# Patient Record
Sex: FEMALE | Race: AMERICAN INDIAN OR ALASKA NATIVE
[De-identification: names, ages, dates, MRNs, and addresses within clinical notes are randomized per-mention and may not be internally consistent; named-entity substitution may affect disease eponyms.]

---

## 2020-04-12 ENCOUNTER — HOSPITAL ENCOUNTER (EMERGENCY)
Dept: HOSPITAL 5 - ED | Age: 45
Discharge: LEFT BEFORE DISCHARGE | End: 2020-04-12
Payer: OTHER GOVERNMENT

## 2020-04-12 VITALS — DIASTOLIC BLOOD PRESSURE: 101 MMHG | SYSTOLIC BLOOD PRESSURE: 140 MMHG

## 2020-04-12 DIAGNOSIS — Z90.710: ICD-10-CM
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
## 2020-04-12 DIAGNOSIS — M54.12: ICD-10-CM
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
## 2020-04-12 DIAGNOSIS — S16.1XXA: Primary | ICD-10-CM
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
## 2020-04-12 PROCEDURE — 99282 EMERGENCY DEPT VISIT SF MDM: CPT

## 2020-04-12 PROCEDURE — 96372 THER/PROPH/DIAG INJ SC/IM: CPT

## 2020-04-12 NOTE — EMERGENCY DEPARTMENT REPORT
ED Neck Pain/Injury HPI





- General


Chief Complaint: Neck Pain/Injury


Stated Complaint: NECK PAIN, LEFT SIDE


Time Seen by Provider: 04/12/20 17:36


Mode of arrival: Ambulatory


Limitations: No Limitations





- History of Present Illness


Initial Comments: 





This is a 44-year-old female nontoxic, well nourished in appearance, no acute 

signs of distress presents to the ED with c/o of acute on chronic upper back 

pain x1 year.  Patient is seeing a neurologist and has an MRI that is ordered of

cervical spine.  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 

similar symptoms as today.  Patient states that pain radiates through to his 

left upper extremity.  Patient denies any trauma.  Denies any bladder or bowel 

instability.  Patient denies any urinary symptoms.  Denies decreased ROM or 

weakness. Denies any fever, chills, nausea, vomiting, headache, stiff neck, 

chest pain or shortness of breath.  Patient denies any numbness or tingling.  

Denies any allergies.  


MD Complaint: upper back pain


-: year(s)


Radiation: left upper extremity


Severity: mild


Severity scale (0 -10): 3


Quality: aching


Consistency: intermittent


Improves With: immobilization


Worsens With: movement of extremity, movement of neck


Associated Symptoms: none.  denies: headache, fever, numbness, tingling, 

weakness, vertigo, difficulty walking, swollen glands, difficulty swallowing, 

nausea, vomiting


Treatments Prior to Arrival: none





- Related Data


                                    Allergies











Allergy/AdvReac Type Severity Reaction Status Date / Time


 


No Known Allergies Allergy   Unverified 04/12/20 17:18














ED Review of Systems


ROS: 


Stated complaint: NECK PAIN, LEFT SIDE


Other details as noted in HPI





Constitutional: denies: chills, fever


Eyes: denies: eye pain, eye discharge, vision change


ENT: denies: ear pain, throat pain


Respiratory: denies: cough, shortness of breath, wheezing


Cardiovascular: denies: chest pain, palpitations


Endocrine: no symptoms reported


Gastrointestinal: denies: abdominal pain, nausea, diarrhea


Genitourinary: denies: urgency, dysuria, discharge


Musculoskeletal: denies: back pain, joint swelling, arthralgia


Skin: denies: rash, lesions


Neurological: denies: headache, weakness, paresthesias


Psychiatric: denies: anxiety, depression


Hematological/Lymphatic: denies: easy bleeding, easy bruising





ED Past Medical Hx





- Past Medical History


Previous Medical History?: Yes


Additional medical history: fibromyalgia,lower back pain, sees neurologist for 

c-spine issues,





- Surgical History


Past Surgical History?: Yes


Additional Surgical History: hysterectomy





- Social History


Smoking Status: Never Smoker


Substance Use Type: None





ED Physical Exam





- General


Limitations: No Limitations


General appearance: alert, in no apparent distress





- Head


Head exam: Present: atraumatic, normocephalic





- Eye


Eye exam: Present: normal appearance





- Neck


Neck exam: Present: normal inspection, full ROM.  Absent: tenderness, 

meningismus, lymphadenopathy





- Extremities Exam


Extremities exam: Present: normal inspection, full ROM, normal capillary refill.

 Absent: tenderness, joint swelling





- Back Exam


Back exam: Present: normal inspection, full ROM, paraspinal tenderness (cervical

left sided paraspinal area).  Absent: tenderness, CVA tenderness (R), CVA 

tenderness (L), muscle spasm, vertebral tenderness, rash noted





- Neurological Exam


Neurological exam: Present: alert, oriented X3, normal gait





- Psychiatric


Psychiatric exam: Present: normal affect, normal mood





- Skin


Skin exam: Present: warm, dry, intact, normal color.  Absent: rash





ED Course





                                   Vital Signs











  04/12/20





  17:14


 


Temperature 98.1 F


 


Pulse Rate 77


 


Respiratory 18





Rate 


 


Blood Pressure 140/101


 


O2 Sat by Pulse 98





Oximetry 














- Reevaluation(s)


Reevaluation #1: 





04/12/20 18:19


Patient is speaking in full sentences with no signs of distress noted.





ED Medical Decision Making





- Medical Decision Making





This is a 44-year-old female that presents with cervical muscle strain and 

radiculopathy.  Patient is stable was examined by me.  There is no spinal 

tenderness.  There is no cauda equina syndrome during examination.  No bladder 

or bowel instability. Patient received Toradol 60 mg IM and prednisone in the ED

which stated that her symptoms has resolved and subsided.  Patient has 

medications from her Neurologist at discharge of Oxycondone.  Patient was 

referred to Follow-up with a primary care doctor in 3-5 days or if symptoms 

worsen and continue return to emergency room as soon as possible.  At time of 

discharge, the patient does not seem toxic or ill in appearance.  No acute signs

of distress noted.  Patient agrees to discharge treatment plan of care.  No 

further questions noted by the patient.





This chart is dictated with using Dragon Dictation Program


Critical care attestation.: 


If time is entered above; I have spent that time in minutes in the direct care 

of this critically ill patient, excluding procedure time.








ED Disposition


Clinical Impression: 


 Cervical radiculopathy





Cervical muscle strain


Qualifiers:


 Encounter type: initial encounter Qualified Code(s): S16.1XXA - Strain of 

muscle, fascia and tendon at neck level, initial encounter





Disposition: Z-07 [facility] SCREENING EXAM-LEFT


Is pt being admited?: No


Does the pt Need Aspirin: No


Condition: Stable


Instructions:  Muscle Strain (ED), Cervical Radiculopathy (ED)


Additional Instructions: 


Follow-up with a primary care doctor in 3-5 days or if symptoms worsen and 

continue return to emergency room as soon as possible. 


Referrals: 


LYNN WERNER MD [Primary Care Provider] - 3-5 Days


PRIMARY CARE,MD [Referring] - 3-5 Days


CARMEN CHAKRABORTY MD [Staff Physician] - 3-5 Days


[facility] [Provider Group] - 3-5 Days

## 2024-02-21 ENCOUNTER — LAB (OUTPATIENT)
Dept: URBAN - METROPOLITAN AREA CLINIC 4 | Facility: CLINIC | Age: 49
End: 2024-02-21
Payer: OTHER GOVERNMENT

## 2024-02-21 ENCOUNTER — DAC (OUTPATIENT)
Dept: URBAN - METROPOLITAN AREA SURGERY CENTER 23 | Facility: SURGERY CENTER | Age: 49
End: 2024-02-21
Payer: OTHER GOVERNMENT

## 2024-02-21 DIAGNOSIS — D12.3 BENIGN NEOPLASM OF TRANSVERSE COLON: ICD-10-CM

## 2024-02-21 DIAGNOSIS — R19.5 ABNORMAL CONSISTENCY OF STOOL: ICD-10-CM

## 2024-02-21 DIAGNOSIS — D12.3 ADENOMA OF TRANSVERSE COLON: ICD-10-CM

## 2024-02-21 DIAGNOSIS — Z12.11 COLON CANCER SCREENING: ICD-10-CM

## 2024-02-21 PROCEDURE — 45385 COLONOSCOPY W/LESION REMOVAL: CPT | Performed by: INTERNAL MEDICINE

## 2024-02-21 PROCEDURE — 88305 TISSUE EXAM BY PATHOLOGIST: CPT | Performed by: PATHOLOGY
